# Patient Record
Sex: FEMALE | Race: BLACK OR AFRICAN AMERICAN | NOT HISPANIC OR LATINO | URBAN - METROPOLITAN AREA
[De-identification: names, ages, dates, MRNs, and addresses within clinical notes are randomized per-mention and may not be internally consistent; named-entity substitution may affect disease eponyms.]

---

## 2024-05-23 ENCOUNTER — EMERGENCY (EMERGENCY)
Facility: HOSPITAL | Age: 29
LOS: 1 days | Discharge: HOME | End: 2024-05-23
Attending: EMERGENCY MEDICINE | Admitting: EMERGENCY MEDICINE
Payer: MEDICAID

## 2024-05-23 VITALS
DIASTOLIC BLOOD PRESSURE: 89 MMHG | TEMPERATURE: 98 F | HEIGHT: 67 IN | OXYGEN SATURATION: 100 % | SYSTOLIC BLOOD PRESSURE: 127 MMHG | HEART RATE: 88 BPM | WEIGHT: 279.99 LBS | RESPIRATION RATE: 20 BRPM

## 2024-05-23 DIAGNOSIS — Z87.42 PERSONAL HISTORY OF OTHER DISEASES OF THE FEMALE GENITAL TRACT: Chronic | ICD-10-CM

## 2024-05-23 PROCEDURE — 99284 EMERGENCY DEPT VISIT MOD MDM: CPT

## 2024-05-23 PROCEDURE — 99283 EMERGENCY DEPT VISIT LOW MDM: CPT

## 2024-05-23 NOTE — ED PROVIDER NOTE - NSFOLLOWUPINSTRUCTIONS_ED_ALL_ED_FT
Pelvic Organ Prolapse    Pelvic organ prolapse is a condition in women that involves the stretching, bulging, or dropping of pelvic organs into an abnormal position, past the opening of the vagina. It happens when the muscles and tissues that surround and support pelvic structures become weak or stretched. Pelvic organ prolapse can involve the:  Vagina (vaginal prolapse).  Uterus (uterine prolapse).  Bladder (cystocele).  Rectum (rectocele).  Intestines (enterocele).  When organs other than the vagina are involved, they often bulge into the vagina or protrude from the vagina, depending on how severe the prolapse is.    What are the causes?  This condition may be caused by:  Pregnancy, labor, and childbirth.  Past pelvic surgery.  Lower levels of the hormone estrogen due to menopause.  Consistently lifting more than 50 lb (23 kg).  Obesity.  Long-term difficulty passing stool (chronic constipation).  Long-term, or chronic, cough.  Fluid buildup in the abdomen due to certain conditions.  What are the signs or symptoms?  Symptoms of this condition include:  Leaking a little urine (loss of bladder control) when you cough, sneeze, strain, and exercise (stress incontinence). This may be worse immediately after childbirth. It may gradually improve over time.  Feeling pressure in your pelvis or vagina. This pressure may increase when you cough or when you are passing stool.  A bulge that protrudes from the opening of your vagina.  Difficulty passing urine or stool.  Pain in your lower back.  Pain or discomfort during sex, or decreased interest in sex.  Repeated bladder infections (urinary tract infections).  Difficulty inserting a tampon.  In some people, this condition causes no symptoms.    How is this diagnosed?  This condition may be diagnosed based on a vaginal and rectal exam. During the exam, you may be asked to cough and strain while you are lying down, sitting, and standing up. Your health care provider will determine if other tests are required, such as bladder function tests.    How is this treated?  Treatment for this condition may depend on your symptoms. Treatment may include:  Lifestyle changes, such as drinking plenty of fluids and eating foods that are high in fiber.  Emptying your bladder at scheduled times (bladder training therapy). This can help reduce or avoid urinary incontinence.  Estrogen. This may help mild prolapse by increasing the strength and tone of pelvic floor muscles.  Kegel exercises. These may help mild cases of prolapse by strengthening and tightening the muscles of the pelvic floor.  A soft, flexible device that helps support the vaginal walls and keep pelvic organs in place (pessary). This is inserted into your vagina by your health care provider.  Surgery. This is often the only form of treatment for severe prolapse.  Follow these instructions at home:  Eating and drinking    Avoid drinking beverages that contain caffeine or alcohol.  Increase your intake of high-fiber foods to decrease constipation and straining during bowel movements.  Activity    Lose weight if recommended by your health care provider.  Avoid heavy lifting and straining with exercise and work. Do not hold your breath when you perform mild to moderate lifting and exercise activities. Limit your activities as directed by your health care provider.  Do Kegel exercises as directed by your health care provider. To do this:  Squeeze your pelvic floor muscles tight. You should feel a tight lift in your rectal area and a tightness in your vaginal area. Keep your stomach, buttocks, and legs relaxed.  Hold the muscles tight for up to 10 seconds. Then relax your muscles.  Repeat this exercise 50 times a day, or as much as told by your health care provider. Continue to do this exercise for at least 4–6 weeks, or for as long as told by your health care provider.  General instructions    Take over-the-counter and prescription medicines only as told by your health care provider.  Wear a sanitary pad or adult diapers if you have urinary incontinence.  If you have a pessary, take care of it as told by your health care provider.  Keep all follow-up visits. This is important.  Contact a health care provider if you:  Have symptoms that interfere with your daily activities or sex life.  Need medicine to help with the discomfort.  Notice bleeding from your vagina that is not related to your menstrual period.  Have a fever.  Have pain or bleeding when you urinate.  Have bleeding when you pass stool.  Pass urine when you have sex.  Have chronic constipation.  Have a pessary that falls out.  Have a foul-smelling vaginal discharge.  Have an unusual, low pain in your abdomen.  Get help right away if you:  Cannot pass urine.  Summary  Pelvic organ prolapse is the stretching, bulging, or dropping of pelvic organs into an abnormal position. It happens when the muscles and tissues that surround and support pelvic structures become weak or stretched.  When organs other than the vagina are involved, they often bulge into the vagina or protrude from it, depending on how severe the prolapse is.  In most cases, this condition needs to be treated only if it produces symptoms. Treatment may include lifestyle changes, estrogen, Kegel exercises, pessary insertion, or surgery.  Avoid heavy lifting and straining with exercise and work. Do not hold your breath when you perform mild to moderate lifting and exercise activities. Limit your activities as directed by your health care provider.  This information is not intended to replace advice given to you by your health care provider. Make sure you discuss any questions you have with your health care provider.

## 2024-05-23 NOTE — ED PROVIDER NOTE - CARE PROVIDER_API CALL
Maria Luisa Jerry  Obstetrics and Gynecology  [address] [place] [id number]  Phone: [phone]  Fax: [phone]  Follow Up Time: 1-3 Days

## 2024-05-23 NOTE — ED PROVIDER NOTE - PATIENT PORTAL LINK FT
You can access the FollowMyHealth Patient Portal offered by [facility] by registering at the following website: [email or web address]. By joining [facility]’s FollowMyHealth portal, you will also be able to view your health information using other applications (apps) compatible with our system.

## 2024-05-23 NOTE — ED ADULT NURSE NOTE - NSFALLOOBATTEMPT_ED_ALL_ED
Patient reports a week history of cough, congestion, and shortness of breath. Reports sick contacts at home.    No

## 2024-05-23 NOTE — ED PROVIDER NOTE - CLINICAL SUMMARY MEDICAL DECISION MAKING FREE TEXT BOX
Patient with a report of prolapse of vaginal contents.  Noted tonight in the shower.  Patient has no prolapse on exam here.  Has history of uterine prolapse in the past.  Patient does not have a gynecologist at this time.  Will provide gynecology follow-up.  No acute intervention required tonight.

## 2024-05-23 NOTE — ED PROVIDER NOTE - OBJECTIVE STATEMENT
Patient states she was in the shower tonight and felt the inside of her vagina coming out.  Patient states she has a history of uterine prolapse after giving birth.  Had surgery to repair this.  Patient denies any pain at this time.  No vaginal bleeding.  No difficulty urinating.  No nausea vomiting or abdominal pain.

## 2024-05-23 NOTE — ED ADULT NURSE NOTE - OBJECTIVE STATEMENT
pt to ED reports after she showered she "felt something in my vagina". pt reports history of prolapse uterus 9 years ago. denies pain presently. no prolapse seen at this time
